# Patient Record
Sex: MALE | Race: WHITE
[De-identification: names, ages, dates, MRNs, and addresses within clinical notes are randomized per-mention and may not be internally consistent; named-entity substitution may affect disease eponyms.]

---

## 2019-06-03 ENCOUNTER — HOSPITAL ENCOUNTER (EMERGENCY)
Dept: HOSPITAL 41 - JD.ED | Age: 29
Discharge: HOME | End: 2019-06-03
Payer: SELF-PAY

## 2019-06-03 DIAGNOSIS — F17.210: ICD-10-CM

## 2019-06-03 DIAGNOSIS — L85.3: Primary | ICD-10-CM

## 2019-06-03 LAB
C TRACH DNA SPEC QL NAA+PROBE: NOT DETECTED
N GONORRHOEA DNA SPEC QL NAA+PROBE: NOT DETECTED

## 2019-06-03 NOTE — EDM.PDOC
ED HPI GENERAL MEDICAL PROBLEM





- General


Chief Complaint: Skin Complaint


Stated Complaint: RED SCALY SKIN


Time Seen by Provider: 06/03/19 16:01





- History of Present Illness


INITIAL COMMENTS - FREE TEXT/NARRATIVE: 





29-year-old male presents emergency room with a skin rash on his penis. 





This started 2 days ago he noticed some itching no burning no pain and he 

noticed some reddened skin that was scaly. Today he started putting coconut oil 

over the area and this is helped quite a bit with the itching and took the 

scaliness away. Patient denies any new sexual contacts. He is not having any 

urinary symptoms. No other complaints











- Related Data


 Allergies











Allergy/AdvReac Type Severity Reaction Status Date / Time


 


No Known Allergies Allergy   Verified 06/03/19 14:10











Home Meds: 


 Home Meds





. [No Known Home Meds]  12/22/18 [History]











Past Medical History





- Past Health History


Medical/Surgical History: Denies Medical/Surgical History





Social & Family History





- Tobacco Use


Smoking Status *Q: Current Every Day Smoker


Years of Tobacco use: 15


Packs/Tins Daily: 0.5





- Caffeine Use


Caffeine Use: Reports: Coffee





- Recreational Drug Use


Recreational Drug Use: No





ED ROS GENERAL





- Review of Systems


Review Of Systems: See Below


Constitutional: Reports: No Symptoms


Respiratory: Reports: No Symptoms


Cardiovascular: Reports: No Symptoms


GI/Abdominal: Reports: No Symptoms


: Reports: No Symptoms.  Denies: Discharge, Dysuria, Frequency, Hematuria, 

Pain





ED EXAM, SKIN/RASH


Exam: See Below


Exam Limited By: No Limitations


General Appearance: Alert, No Apparent Distress


Respiratory/Chest: No Respiratory Distress, Lungs Clear, Normal Breath Sounds


Cardiovascular: Regular Rate, Rhythm, No Edema, No Murmur


 (Male) Exam: Other (On the right side of his penis along the shaft he has an 

area that could be consistent with dry skin there is no blistering or 

ulcerations. It doesn't have the sensation is an acute herpes type reaction he 

has some mild itching. This improved considerably with coconut oil)





Course





- Vital Signs


Last Recorded V/S: 


 Last Vital Signs











Temp  36.6 C   06/03/19 14:07


 


Pulse  81   06/03/19 14:07


 


Resp  16   06/03/19 14:07


 


BP  117/73   06/03/19 14:07


 


Pulse Ox  98   06/03/19 14:07














- Orders/Labs/Meds


Labs: 


 Laboratory Tests











  06/03/19 06/03/19 Range/Units





  14:14 15:00 


 


Urine Color   Yellow  (Yellow)  


 


Urine Appearance   Clear  (Clear)  


 


Urine pH   7.0  (5.0-8.0)  


 


Ur Specific Gravity   1.025  (1.005-1.030)  


 


Urine Protein   2+ H  (Negative)  


 


Urine Glucose (UA)   Negative  (Negative)  


 


Urine Ketones   Trace H  (Negative)  


 


Urine Occult Blood   Negative  (Negative)  


 


Urine Nitrite   Negative  (Negative)  


 


Urine Bilirubin   1+ H  (Negative)  


 


Urine Urobilinogen   1.0  (0.2-1.0)  


 


Ur Leukocyte Esterase   Negative  (Negative)  


 


Urine RBC   0-5  (0-5)  /hpf


 


Urine WBC   0-5  (0-5)  /hpf


 


Ur Epithelial Cells   0-5  (0-5)  /hpf


 


Urine Bacteria   Few  (FEW)  /hpf


 


Urine Mucus   Few  (FEW)  /hpf


 


C trachomatis DNA (PCR)  Not detected   


 


N gonorrhoeae DNA (PCR)  Not detected   














- Re-Assessments/Exams


Free Text/Narrative Re-Assessment/Exam: 





06/03/19 16:40


Other than the skin changes exam is unremarkable. It sounds like it is not an 

STD or other pathology at this point using a good skin moisturizer with close 

follow-up is reasonable. Discussed this as well as the possibility of further 

testing and patient agrees to follow-up in the clinic this week to make sure 

things are improving.





Departure





- Departure


Time of Disposition: 16:35


Disposition: Home, Self-Care 01


Clinical Impression: 


 Dry skin








- Discharge Information


Referrals: 


PCP,None [Primary Care Provider] - 


Forms:  ED Department Discharge


Additional Instructions: 


Return to the emergency room with any questions problems worsening symptoms. 





Follow-up at the Hospital clinic at the end of this week for recheck to make 

sure things are getting better. 





Use a good skin moisturizer several times daily and avoid using soaps to the 

area.